# Patient Record
Sex: FEMALE | Race: WHITE | NOT HISPANIC OR LATINO | Employment: UNEMPLOYED | URBAN - METROPOLITAN AREA
[De-identification: names, ages, dates, MRNs, and addresses within clinical notes are randomized per-mention and may not be internally consistent; named-entity substitution may affect disease eponyms.]

---

## 2018-04-11 ENCOUNTER — OFFICE VISIT (OUTPATIENT)
Dept: OBSTETRICS AND GYNECOLOGY | Facility: CLINIC | Age: 38
End: 2018-04-11

## 2018-04-11 VITALS
HEIGHT: 66 IN | SYSTOLIC BLOOD PRESSURE: 124 MMHG | BODY MASS INDEX: 45.48 KG/M2 | WEIGHT: 283 LBS | DIASTOLIC BLOOD PRESSURE: 80 MMHG

## 2018-04-11 DIAGNOSIS — N94.12 DEEP DYSPAREUNIA: ICD-10-CM

## 2018-04-11 DIAGNOSIS — R63.8 INCREASED BMI: ICD-10-CM

## 2018-04-11 DIAGNOSIS — N92.1 MENOMETRORRHAGIA: ICD-10-CM

## 2018-04-11 DIAGNOSIS — I15.9 SECONDARY HYPERTENSION: ICD-10-CM

## 2018-04-11 DIAGNOSIS — Z98.51 HISTORY OF TUBAL LIGATION: ICD-10-CM

## 2018-04-11 DIAGNOSIS — Z13.9 SCREENING FOR CONDITION: Primary | ICD-10-CM

## 2018-04-11 LAB
B-HCG UR QL: NEGATIVE
BILIRUB BLD-MCNC: NEGATIVE MG/DL
CLARITY, POC: CLEAR
COLOR UR: YELLOW
GLUCOSE UR STRIP-MCNC: NEGATIVE MG/DL
INTERNAL NEGATIVE CONTROL: NEGATIVE
INTERNAL POSITIVE CONTROL: POSITIVE
KETONES UR QL: NEGATIVE
LEUKOCYTE EST, POC: NEGATIVE
Lab: NORMAL
NITRITE UR-MCNC: NEGATIVE MG/ML
PH UR: 5 [PH] (ref 5–8)
PROT UR STRIP-MCNC: NEGATIVE MG/DL
RBC # UR STRIP: NEGATIVE /UL
SP GR UR: 1 (ref 1–1.03)
UROBILINOGEN UR QL: NORMAL

## 2018-04-11 PROCEDURE — 81025 URINE PREGNANCY TEST: CPT | Performed by: OBSTETRICS & GYNECOLOGY

## 2018-04-11 PROCEDURE — 99204 OFFICE O/P NEW MOD 45 MIN: CPT | Performed by: OBSTETRICS & GYNECOLOGY

## 2018-04-11 RX ORDER — BUPRENORPHINE HYDROCHLORIDE AND NALOXONE HYDROCHLORIDE DIHYDRATE 8; 2 MG/1; MG/1
TABLET SUBLINGUAL
Refills: 0 | COMMUNITY
Start: 2018-04-05

## 2018-04-11 RX ORDER — GABAPENTIN 400 MG/1
400 CAPSULE ORAL 3 TIMES DAILY
Refills: 0 | COMMUNITY
Start: 2018-03-20

## 2018-04-11 RX ORDER — TRAZODONE HYDROCHLORIDE 150 MG/1
150 TABLET ORAL
Refills: 0 | COMMUNITY
Start: 2018-03-01 | End: 2020-08-08

## 2018-04-11 NOTE — PROGRESS NOTES
Patient Care Team:  No Known Provider as PCP - General    Patient new to practice? yes Patient new to examiner? yes     -----------------------------------------------------HISTORY---------------------------------------------------    Chief Complaint:   Chief Complaint   Patient presents with   • Menstrual Problem     New problem to examiner? yes    No LMP recorded (lmp unknown).      HPI: Pt here with multiple complaints.  Hasn't had insurance in a while and needs to get caught up.  Is having abnormal bleeding, pelvic pain.      Menstrual Problem   This is a chronic problem. The current episode started more than 1 year ago. The problem occurs intermittently. The problem has been gradually worsening. Associated symptoms include abdominal pain. The symptoms are aggravated by exertion. She has tried nothing for the symptoms. The treatment provided no relief.         Review of Systems   Constitutional: Negative.    HENT: Negative.    Eyes: Negative.    Respiratory: Negative.    Cardiovascular: Negative.    Gastrointestinal: Positive for abdominal pain.   Endocrine: Negative.    Genitourinary: Positive for menstrual problem.   Musculoskeletal: Negative.    Skin: Negative.    Allergic/Immunologic: Negative.    Neurological: Negative.    Hematological: Negative.    Psychiatric/Behavioral: Negative.    :      PFSH: PAST HISTORY REVIEWED     1.  History reviewed. No pertinent past medical history.        Status of:      2.   Family History   Problem Relation Age of Onset   • Adopted: Yes   • Cervical cancer Sister    • Ovarian cancer Maternal Grandmother        3. Social History: :    Employment/occupation:  yes  Smoker:  yes  Alcohol: occ Recreational drugs: yes:  THC    4.   Past Surgical History:   Procedure Laterality Date   • TUBAL ABDOMINAL LIGATION          5.   Current Outpatient Prescriptions:   •  buprenorphine-naloxone (SUBOXONE) 8-2 MG per SL tablet, take 2 tablets under the tongue once daily,  "Disp: , Rfl: 0  •  gabapentin (NEURONTIN) 300 MG capsule, Take 300 mg by mouth 3 (Three) Times a Day., Disp: , Rfl: 0  •  traZODone (DESYREL) 150 MG tablet, Take 150 mg by mouth every night at bedtime., Disp: , Rfl: 0    6. No Known Allergies                  -----------------------------------------------PHYSICAL EXAM----------------------------------------------    Vital Signs: /80   Ht 167.6 cm (66\")   Wt 128 kg (283 lb)   LMP  (LMP Unknown)   Breastfeeding? No   BMI 45.68 kg/m²    Flowsheet Rows    Flowsheet Row First Filed Value   Admission Height 167.6 cm (66\") Documented at 04/11/2018 1332   Admission Weight 128 kg (283 lb) Documented at 04/11/2018 1332          Physical Exam   Constitutional: She is oriented to person, place, and time. She appears well-developed and well-nourished.   HENT:   Head: Normocephalic and atraumatic.   Eyes: EOM are normal.   Neck: Normal range of motion.   Pulmonary/Chest: Effort normal.   Abdominal: Soft. Bowel sounds are normal.   Musculoskeletal: Normal range of motion.   Neurological: She is alert and oriented to person, place, and time.   Skin: Skin is warm and dry.   Psychiatric: She has a normal mood and affect. Her behavior is normal. Judgment and thought content normal.   Nursing note and vitals reviewed.                          -----------------------------------------------MEDICAL DECISION MAKING-----------------------------  Risk counseling done:  Yes.  Counseling and guidance discussed:  Nutrition, family planning/contraception, physical activity, healthy weight, injury prevention, misuse of tobacco, alcohol and drugs, sexual behavior and STDs, dental health, mental health, immunizations, screenings, breast cancer and breast exams.    Results Reviewed:     1.   Lab Results (last 24 hours)     Procedure Component Value Units Date/Time    POC Urinalysis Dipstick [[id number]] Collected:  04/11/18 1336    Specimen:  Urine Updated:  04/11/18 1336     Color " Yellow     Clarity, UA Clear     Glucose, UA Negative mg/dL      Bilirubin Negative     Ketones, UA Negative     Specific Gravity  1.005     Blood, UA Negative     pH, Urine 5.0     Protein, POC Negative mg/dL      Urobilinogen, UA Normal     Leukocytes Negative     Nitrite, UA Negative    POC Pregnancy, Urine [[id number]] Collected:  04/11/18 1336    Specimen:  Urine Updated:  04/11/18 1336     HCG, Urine, QL Negative     Lot Number [id number]     Internal Positive Control Positive     Internal Negative Control Negative        2.   Imaging Results (last 24 hours)     ** No results found for the last 24 hours. **        3.   ECG/EMG Results (most recent)     None          Old records reviewed?  no    Diagnoses and/or chronic conditions reviewed with pt:  Fatoumata was seen today for menstrual problem.    Diagnoses and all orders for this visit:    Screening for condition  -     POC Urinalysis Dipstick  -     POC Pregnancy, Urine    History of tubal ligation    Increased BMI    Menometrorrhagia    Deep dyspareunia    Secondary hypertension        IMP:  Menometrorrhagia, pelvic pain, hx tubal.     PLAN: sonohystogram, embx, pap, Check tsh & cbc from PCP.     Labs/imaging ordered: u/s.  Obtain records from referring physician.     RTO Return if symptoms worsen or fail to improve.          Juan Hamm MD  2:00 PM  04/11/18

## 2018-05-14 ENCOUNTER — OFFICE VISIT (OUTPATIENT)
Dept: OBSTETRICS AND GYNECOLOGY | Facility: CLINIC | Age: 38
End: 2018-05-14

## 2018-05-14 ENCOUNTER — PROCEDURE VISIT (OUTPATIENT)
Dept: OBSTETRICS AND GYNECOLOGY | Facility: CLINIC | Age: 38
End: 2018-05-14

## 2018-05-14 VITALS — BODY MASS INDEX: 45.48 KG/M2 | HEIGHT: 66 IN | WEIGHT: 283 LBS

## 2018-05-14 DIAGNOSIS — R63.8 INCREASED BMI: ICD-10-CM

## 2018-05-14 DIAGNOSIS — N92.1 MENOMETRORRHAGIA: ICD-10-CM

## 2018-05-14 DIAGNOSIS — I15.9 SECONDARY HYPERTENSION: ICD-10-CM

## 2018-05-14 DIAGNOSIS — N92.1 MENOMETRORRHAGIA: Primary | ICD-10-CM

## 2018-05-14 DIAGNOSIS — Z13.9 SCREENING FOR CONDITION: Primary | ICD-10-CM

## 2018-05-14 DIAGNOSIS — Z11.51 SPECIAL SCREENING EXAMINATION FOR HUMAN PAPILLOMAVIRUS (HPV): ICD-10-CM

## 2018-05-14 DIAGNOSIS — Z98.51 HISTORY OF TUBAL LIGATION: ICD-10-CM

## 2018-05-14 DIAGNOSIS — Z01.419 PAP SMEAR, AS PART OF ROUTINE GYNECOLOGICAL EXAMINATION: ICD-10-CM

## 2018-05-14 DIAGNOSIS — N94.12 DEEP DYSPAREUNIA: ICD-10-CM

## 2018-05-14 LAB
B-HCG UR QL: NEGATIVE
INTERNAL NEGATIVE CONTROL: NEGATIVE
INTERNAL POSITIVE CONTROL: POSITIVE
Lab: NORMAL

## 2018-05-14 PROCEDURE — 58340 CATHETER FOR HYSTEROGRAPHY: CPT | Performed by: OBSTETRICS & GYNECOLOGY

## 2018-05-14 PROCEDURE — 76831 ECHO EXAM UTERUS: CPT | Performed by: OBSTETRICS & GYNECOLOGY

## 2018-05-14 PROCEDURE — 81025 URINE PREGNANCY TEST: CPT | Performed by: OBSTETRICS & GYNECOLOGY

## 2018-05-14 PROCEDURE — 58100 BIOPSY OF UTERUS LINING: CPT | Performed by: OBSTETRICS & GYNECOLOGY

## 2018-05-14 RX ORDER — ERGOCALCIFEROL 1.25 MG/1
CAPSULE ORAL
COMMUNITY
Start: 2018-04-20 | End: 2020-08-08

## 2018-05-14 RX ORDER — HYDROXYZINE HYDROCHLORIDE 25 MG/1
TABLET, FILM COATED ORAL
COMMUNITY
Start: 2018-04-20 | End: 2020-08-08

## 2018-05-14 NOTE — PROGRESS NOTES
PROCEDURE NOTE FOR SONOHYSTOGRAM OR SALINE INFUSION SONOGRAM (SIS);   PROCEDURE AND INTERPRETATION.    Patient Active Problem List   Diagnosis   • History of tubal ligation   • Increased BMI   • Menometrorrhagia   • Deep dyspareunia   • Secondary hypertension         INDICATION/CC/PREOP DX: menometrorrhagia, dyspareunia    POSTOP DX: same    Informed consent obtained.    Pt present in ultrasound room, scount ultrasound was already done by technician.    ANESTHESIA USED: none    In dorsolithotomy position, open graves speculum placed in the vagina.  Pap smear done? yes    The  cervix was painted with betadine.    Single tooth tenaculum placed on anterior cervical lip and uterus sounded to 7cms.    Endometrial biopsy suction pipette used to obtain endometrial biopsy and this was sent for permanent section.      Flexible sonohyst catheter placed in endomtrial canal and balloon inflated and 5ccs normal saline infused into endometrial cavity.  Outline of cavity seen clearly and documented.    All instruments were removed.    Pt tolerated procedure well and left in satisfactory condition.    Instructions and precautions given.      IMPRESSION/INTERPRETATION:  Normal endometrial contour.  No abnormality so far.      PLAN:  Check pap & embx.  Pt may be candidate for ablation for abnormal bleeding if all ok.  No obvious cause of the dyspareunia.      Juan Hamm MD  1:08 PM   05/14/18

## 2018-05-17 LAB
C TRACH RRNA CVX QL NAA+PROBE: NEGATIVE
CYTOLOGIST CVX/VAG CYTO: ABNORMAL
CYTOLOGY CVX/VAG DOC THIN PREP: ABNORMAL
DX ICD CODE: ABNORMAL
DX ICD CODE: ABNORMAL
HIV 1 & 2 AB SER-IMP: ABNORMAL
HPV I/H RISK 1 DNA CVX QL PROBE+SIG AMP: NEGATIVE
N GONORRHOEA RRNA CVX QL NAA+PROBE: NEGATIVE
OTHER STN SPEC: ABNORMAL
PATH REPORT.FINAL DX SPEC: ABNORMAL
PATHOLOGIST CVX/VAG CYTO: ABNORMAL
RECOM F/U CVX/VAG CYTO: ABNORMAL
STAT OF ADQ CVX/VAG CYTO-IMP: ABNORMAL
T VAGINALIS RRNA SPEC QL NAA+PROBE: NEGATIVE

## 2018-05-18 LAB
DX ICD CODE: NORMAL
DX ICD CODE: NORMAL
PATH REPORT.FINAL DX SPEC: NORMAL
PATH REPORT.GROSS SPEC: NORMAL
PATH REPORT.SITE OF ORIGIN SPEC: NORMAL
PATHOLOGIST NAME: NORMAL
PAYMENT PROCEDURE: NORMAL

## 2018-05-21 PROBLEM — R87.610 ASCUS OF CERVIX WITH NEGATIVE HIGH RISK HPV: Status: ACTIVE | Noted: 2018-05-21

## 2020-08-08 ENCOUNTER — HOSPITAL ENCOUNTER (EMERGENCY)
Facility: HOSPITAL | Age: 40
Discharge: HOME | End: 2020-08-09
Attending: EMERGENCY MEDICINE | Admitting: EMERGENCY MEDICINE

## 2020-08-08 DIAGNOSIS — F29 PSYCHOSIS, UNSPECIFIED PSYCHOSIS TYPE (HCC): ICD-10-CM

## 2020-08-08 DIAGNOSIS — F15.10 METHAMPHETAMINE ABUSE (HCC): Primary | ICD-10-CM

## 2020-08-08 LAB
ALBUMIN SERPL-MCNC: 4.8 G/DL (ref 3.5–5.2)
ALBUMIN/GLOB SERPL: 1.6 G/DL
ALP SERPL-CCNC: 64 U/L (ref 39–117)
ALT SERPL W P-5'-P-CCNC: 19 U/L (ref 1–33)
AMPHET+METHAMPHET UR QL: POSITIVE
ANION GAP SERPL CALCULATED.3IONS-SCNC: 15 MMOL/L (ref 5–15)
AST SERPL-CCNC: 24 U/L (ref 1–32)
BACTERIA UR QL AUTO: ABNORMAL /HPF
BARBITURATES UR QL SCN: NEGATIVE
BASOPHILS # BLD AUTO: 0.04 10*3/MM3 (ref 0–0.2)
BASOPHILS NFR BLD AUTO: 0.2 % (ref 0–1.5)
BENZODIAZ UR QL SCN: POSITIVE
BILIRUB SERPL-MCNC: 0.9 MG/DL (ref 0–1.2)
BILIRUB UR QL STRIP: ABNORMAL
BUN SERPL-MCNC: 14 MG/DL (ref 6–20)
BUN/CREAT SERPL: 23.7 (ref 7–25)
CALCIUM SPEC-SCNC: 10 MG/DL (ref 8.6–10.5)
CANNABINOIDS SERPL QL: POSITIVE
CHLORIDE SERPL-SCNC: 102 MMOL/L (ref 98–107)
CLARITY UR: ABNORMAL
CO2 SERPL-SCNC: 21 MMOL/L (ref 22–29)
COCAINE UR QL: NEGATIVE
COLOR UR: YELLOW
CREAT SERPL-MCNC: 0.59 MG/DL (ref 0.57–1)
DEPRECATED RDW RBC AUTO: 41.3 FL (ref 37–54)
EOSINOPHIL # BLD AUTO: 0.01 10*3/MM3 (ref 0–0.4)
EOSINOPHIL NFR BLD AUTO: 0.1 % (ref 0.3–6.2)
ERYTHROCYTE [DISTWIDTH] IN BLOOD BY AUTOMATED COUNT: 12.4 % (ref 12.3–15.4)
ETHANOL BLD-MCNC: <10 MG/DL (ref 0–10)
ETHANOL UR QL: <0.01 %
GFR SERPL CREATININE-BSD FRML MDRD: 115 ML/MIN/1.73
GLOBULIN UR ELPH-MCNC: 3 GM/DL
GLUCOSE SERPL-MCNC: 119 MG/DL (ref 65–99)
GLUCOSE UR STRIP-MCNC: NEGATIVE MG/DL
HCG SERPL QL: NEGATIVE
HCT VFR BLD AUTO: 43.9 % (ref 34–46.6)
HGB BLD-MCNC: 14.5 G/DL (ref 12–15.9)
HGB UR QL STRIP.AUTO: ABNORMAL
HYALINE CASTS UR QL AUTO: ABNORMAL /LPF
IMM GRANULOCYTES # BLD AUTO: 0.07 10*3/MM3 (ref 0–0.05)
IMM GRANULOCYTES NFR BLD AUTO: 0.4 % (ref 0–0.5)
KETONES UR QL STRIP: ABNORMAL
LEUKOCYTE ESTERASE UR QL STRIP.AUTO: NEGATIVE
LYMPHOCYTES # BLD AUTO: 1.48 10*3/MM3 (ref 0.7–3.1)
LYMPHOCYTES NFR BLD AUTO: 8.9 % (ref 19.6–45.3)
MCH RBC QN AUTO: 30.1 PG (ref 26.6–33)
MCHC RBC AUTO-ENTMCNC: 33 G/DL (ref 31.5–35.7)
MCV RBC AUTO: 91.3 FL (ref 79–97)
METHADONE UR QL SCN: NEGATIVE
MONOCYTES # BLD AUTO: 1.02 10*3/MM3 (ref 0.1–0.9)
MONOCYTES NFR BLD AUTO: 6.2 % (ref 5–12)
NEUTROPHILS NFR BLD AUTO: 13.96 10*3/MM3 (ref 1.7–7)
NEUTROPHILS NFR BLD AUTO: 84.2 % (ref 42.7–76)
NITRITE UR QL STRIP: NEGATIVE
NRBC BLD AUTO-RTO: 0 /100 WBC (ref 0–0.2)
OPIATES UR QL: NEGATIVE
OXYCODONE UR QL SCN: NEGATIVE
PH UR STRIP.AUTO: 6 [PH] (ref 5–8)
PLATELET # BLD AUTO: 342 10*3/MM3 (ref 140–450)
PMV BLD AUTO: 9.3 FL (ref 6–12)
POTASSIUM SERPL-SCNC: 3.3 MMOL/L (ref 3.5–5.2)
PROT SERPL-MCNC: 7.8 G/DL (ref 6–8.5)
PROT UR QL STRIP: ABNORMAL
RBC # BLD AUTO: 4.81 10*6/MM3 (ref 3.77–5.28)
RBC # UR: ABNORMAL /HPF
REF LAB TEST METHOD: ABNORMAL
SODIUM SERPL-SCNC: 138 MMOL/L (ref 136–145)
SP GR UR STRIP: >=1.03 (ref 1–1.03)
SQUAMOUS #/AREA URNS HPF: ABNORMAL /HPF
UROBILINOGEN UR QL STRIP: ABNORMAL
WBC # BLD AUTO: 16.58 10*3/MM3 (ref 3.4–10.8)
WBC UR QL AUTO: ABNORMAL /HPF

## 2020-08-08 PROCEDURE — 85025 COMPLETE CBC W/AUTO DIFF WBC: CPT | Performed by: NURSE PRACTITIONER

## 2020-08-08 PROCEDURE — 80307 DRUG TEST PRSMV CHEM ANLYZR: CPT | Performed by: NURSE PRACTITIONER

## 2020-08-08 PROCEDURE — 99284 EMERGENCY DEPT VISIT MOD MDM: CPT

## 2020-08-08 PROCEDURE — 81001 URINALYSIS AUTO W/SCOPE: CPT | Performed by: NURSE PRACTITIONER

## 2020-08-08 PROCEDURE — 84703 CHORIONIC GONADOTROPIN ASSAY: CPT | Performed by: NURSE PRACTITIONER

## 2020-08-08 PROCEDURE — 80053 COMPREHEN METABOLIC PANEL: CPT | Performed by: NURSE PRACTITIONER

## 2020-08-08 RX ORDER — NITROFURANTOIN 25; 75 MG/1; MG/1
100 CAPSULE ORAL 2 TIMES DAILY
COMMUNITY

## 2020-08-08 RX ORDER — CLONAZEPAM 0.5 MG/1
0.5 TABLET ORAL 2 TIMES DAILY PRN
COMMUNITY

## 2020-08-08 RX ORDER — BUSPIRONE HYDROCHLORIDE 15 MG/1
30 TABLET ORAL 3 TIMES DAILY PRN
COMMUNITY

## 2020-08-08 RX ORDER — LORAZEPAM 2 MG/ML
1 INJECTION INTRAMUSCULAR ONCE
Status: DISCONTINUED | OUTPATIENT
Start: 2020-08-08 | End: 2020-08-08

## 2020-08-08 RX ORDER — RISPERIDONE 0.5 MG/1
2 TABLET, ORALLY DISINTEGRATING ORAL ONCE
Status: DISCONTINUED | OUTPATIENT
Start: 2020-08-08 | End: 2020-08-08

## 2020-08-08 RX ORDER — RISPERIDONE 1 MG/ML
2 SOLUTION ORAL ONCE
Status: COMPLETED | OUTPATIENT
Start: 2020-08-08 | End: 2020-08-08

## 2020-08-08 RX ORDER — LORAZEPAM 1 MG/1
2 TABLET ORAL ONCE
Status: COMPLETED | OUTPATIENT
Start: 2020-08-08 | End: 2020-08-08

## 2020-08-08 RX ADMIN — LORAZEPAM 2 MG: 1 TABLET ORAL at 21:23

## 2020-08-08 RX ADMIN — RISPERIDONE 2 MG: 1 SOLUTION ORAL at 21:23
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
## 2020-08-08 NOTE — ED PROVIDER NOTES
"Pt presents to the ED c/o  increasing anxiety and hallucinations.  Patient states that she came from [facility] to here to see her son.  She also admits that she uses marijuana and tried methamphetamine 3 days ago.  EMS reports the patient was actively hallucinating and was talking to somebody who was not there.  Patient states she has anxiety and depression and is on Klonopin.  Her last dose of Klonopin was yesterday.     On exam,   Her heart is regular rate and rhythm without any murmurs.  Her lungs are clear to auscultation bilaterally.  Abdomen is normoactive bowel sounds, soft, nontender nondistended.  Patient appears disheveled and she has a healing ecchymosis around her left eye.  Neurologically, patient has pressured speech and is anxious.  She is not homicidal but has had occasional suicidal thoughts.  However, patient states that \"God will take care of me.\"     2100  Patient has been evaluated by Leonel with access.  He recommends that we give patient Ativan and Risperdal.  He states that she is not in a good state of mind to determine disposition secondary to patient's meth use.  He will come by at 5 AM to reevaluate patient to determine disposition of patient.    2313  I have discussed the case with Dr. Correa, emergency medicine.  The plan is to have patient reevaluated by [facility] later this evening to determine disposition of patient.  Dr. Correa takes over care of patient.  Plan: Agree with plan of checking alcohol and urine drug screen and consulting psych.      Patient was placed in face mask in first look. Patient was wearing facemask when I entered the room and throughout our encounter. I wore full protective equipment throughout this patient encounter including a face mask, eye shield and gloves. Hand hygiene was performed before donning protective equipment and after removal when leaving the room.    Final diagnoses:   Methamphetamine abuse (CMS/[facility])   Psychosis, unspecified psychosis " type (CMS/[facility])          Attestation:  The [facility] and I have discussed this patient's history, physical exam, and treatment plan.  I have reviewed the documentation and personally had a face to face interaction with the patient. I affirm the documentation and agree with the treatment and plan.  The attached note describes my personal findings.            Jas Ruiz MD  08/08/20 1892       Jas Ruiz MD  08/08/20 1388
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
## 2020-08-09 VITALS
DIASTOLIC BLOOD PRESSURE: 81 MMHG | WEIGHT: 230 LBS | BODY MASS INDEX: 40.75 KG/M2 | RESPIRATION RATE: 16 BRPM | TEMPERATURE: 98.2 F | HEIGHT: 63 IN | HEART RATE: 98 BPM | SYSTOLIC BLOOD PRESSURE: 122 MMHG | OXYGEN SATURATION: 95 %

## 2020-08-09 PROCEDURE — 90791 PSYCH DIAGNOSTIC EVALUATION: CPT

## 2020-12-10 ENCOUNTER — OFFICE VISIT (OUTPATIENT)
Dept: OBSTETRICS AND GYNECOLOGY | Facility: CLINIC | Age: 40
End: 2020-12-10

## 2020-12-10 VITALS
SYSTOLIC BLOOD PRESSURE: 124 MMHG | BODY MASS INDEX: 38.88 KG/M2 | DIASTOLIC BLOOD PRESSURE: 76 MMHG | HEIGHT: 63 IN | WEIGHT: 219.4 LBS

## 2020-12-10 DIAGNOSIS — R63.8 INCREASED BMI: ICD-10-CM

## 2020-12-10 DIAGNOSIS — Z01.419 ROUTINE GYNECOLOGICAL EXAMINATION: Primary | ICD-10-CM

## 2020-12-10 DIAGNOSIS — N92.0 MENORRHAGIA WITH REGULAR CYCLE: ICD-10-CM

## 2020-12-10 DIAGNOSIS — F17.200 SMOKER: ICD-10-CM

## 2020-12-10 DIAGNOSIS — Z01.419 PAP SMEAR, LOW-RISK: ICD-10-CM

## 2020-12-10 DIAGNOSIS — Z11.51 SPECIAL SCREENING EXAMINATION FOR HUMAN PAPILLOMAVIRUS (HPV): ICD-10-CM

## 2020-12-10 DIAGNOSIS — E66.01 MORBIDLY OBESE (HCC): ICD-10-CM

## 2020-12-10 DIAGNOSIS — Z98.51 S/P TUBAL LIGATION: ICD-10-CM

## 2020-12-10 PROCEDURE — 58100 BIOPSY OF UTERUS LINING: CPT | Performed by: NURSE PRACTITIONER

## 2020-12-10 PROCEDURE — 81025 URINE PREGNANCY TEST: CPT | Performed by: NURSE PRACTITIONER

## 2020-12-10 PROCEDURE — 99395 PREV VISIT EST AGE 18-39: CPT | Performed by: NURSE PRACTITIONER

## 2020-12-10 RX ORDER — OXCARBAZEPINE 150 MG/1
150 TABLET, FILM COATED ORAL 2 TIMES DAILY
COMMUNITY
Start: 2020-10-06

## 2020-12-10 RX ORDER — CLONAZEPAM 1 MG/1
TABLET ORAL
COMMUNITY
Start: 2020-11-18

## 2020-12-10 RX ORDER — QUETIAPINE FUMARATE 50 MG/1
TABLET, FILM COATED ORAL
COMMUNITY
Start: 2020-12-02

## 2020-12-10 NOTE — PROGRESS NOTES
"GYN Annual Exam     Chief Complaint   Patient presents with   • Follow-up     US       Fatoumata Jara is a 39 y.o. female who presents for annual well woman exam. She is an established patient. She is a . She is not sexually active at this time. Her last sexual activity was approx 2-3 months ago. Currently using tubal for contraception. She is happy with her contraception: no because her periods are heavy. No intermenstrual bleeding, spotting, or discharge.  Performing SBE: does not do regularly     She complains of heavy periods. She states her period occur every 22 or so days. She bleeds usually 3-5 days. She states the 2nd and 3rd day are very heavy. She also has clotting. The clots are described as dime to nickel size. She wears pad. She states she soaks through her pad \" all the time.\" She does use overnight pads during the day d/t the heaviness of her flow. She cramps occas.     HPI    OB History        4    Para   2    Term   2            AB   2    Living   2       SAB        TAB        Ectopic        Molar        Multiple        Live Births                    Last Pap : ASCUS/HPV neg     History of abnormal Pap smear: yes - Reports \" a long long time ago.\"   Family history of uterine, colon or ovarian cancer: no  Family history of breast cancer: no  History of abnormal mammogram: no  Gardasil Vaccine: Did not get     Past Medical History:   Diagnosis Date   • Anxiety    • Gestational diabetes        Past Surgical History:   Procedure Laterality Date   • TUBAL ABDOMINAL LIGATION           Current Outpatient Medications:   •  buprenorphine-naloxone (SUBOXONE) 8-2 MG per SL tablet, take 2 tablets under the tongue once daily, Disp: , Rfl: 0  •  busPIRone (BUSPAR) 15 MG tablet, Take 30 mg by mouth 3 (Three) Times a Day As Needed., Disp: , Rfl:   •  clonazePAM (KlonoPIN) 0.5 MG tablet, Take 0.5 mg by mouth 2 (Two) Times a Day As Needed for Anxiety or Seizures., Disp: , Rfl:   •  clonazePAM " "(KlonoPIN) 1 MG tablet, , Disp: , Rfl:   •  gabapentin (NEURONTIN) 400 MG capsule, Take 400 mg by mouth 3 (Three) Times a Day., Disp: , Rfl: 0  •  nitrofurantoin, macrocrystal-monohydrate, (MACROBID) 100 MG capsule, Take 100 mg by mouth 2 (Two) Times a Day., Disp: , Rfl:   •  OXcarbazepine (TRILEPTAL) 150 MG tablet, Take 150 mg by mouth 2 (Two) Times a Day., Disp: , Rfl:   •  QUEtiapine (SEROquel) 50 MG tablet, , Disp: , Rfl:   •  sertraline (ZOLOFT) 50 MG tablet, Reports she started on this 3 days ago, only took one dose, Disp: , Rfl:     No Known Allergies    Social History     Tobacco Use   • Smoking status: Current Every Day Smoker   Substance Use Topics   • Alcohol use: No   • Drug use: No       Family History   Adopted: Yes   Problem Relation Age of Onset   • Cervical cancer Sister    • Ovarian cancer Maternal Grandmother    • Breast cancer Neg Hx    • Colon cancer Neg Hx    • Uterine cancer Neg Hx        Review of Systems   Constitutional: Positive for fatigue and unexpected weight gain.   Respiratory: Negative.    Cardiovascular: Negative.    Gastrointestinal: Negative.    Genitourinary: Positive for menstrual problem.   Musculoskeletal: Negative.    Skin: Negative.    Neurological: Negative.    Psychiatric/Behavioral: Negative.        /76   Ht 160 cm (62.99\")   Wt 99.5 kg (219 lb 6.4 oz)   LMP 12/05/2020   Breastfeeding No   BMI 38.87 kg/m²     Physical Exam  Vitals signs reviewed.   Constitutional:       Appearance: She is well-developed.   Neck:      Musculoskeletal: Normal range of motion and neck supple.      Thyroid: No thyromegaly.   Cardiovascular:      Rate and Rhythm: Normal rate and regular rhythm.   Pulmonary:      Effort: Pulmonary effort is normal.      Breath sounds: Normal breath sounds.   Chest:      Breasts:         Right: No inverted nipple, mass, nipple discharge, skin change or tenderness.         Left: No inverted nipple, mass, nipple discharge, skin change or tenderness. "   Abdominal:      General: Abdomen is flat. Bowel sounds are normal.      Palpations: Abdomen is soft.   Genitourinary:     Exam position: Supine.      Labia:         Right: No rash, tenderness, lesion or injury.         Left: No rash, tenderness, lesion or injury.       Vagina: No signs of injury and foreign body. No vaginal discharge, erythema, tenderness or bleeding.      Cervix: No cervical motion tenderness, discharge or friability.      Uterus: Not deviated, not enlarged, not fixed and not tender.       Adnexa:         Right: No mass, tenderness or fullness.          Left: No mass, tenderness or fullness.        Rectum: Normal.   Musculoskeletal: Normal range of motion.         General: No swelling.   Skin:     General: Skin is warm and dry.   Neurological:      General: No focal deficit present.      Mental Status: She is alert and oriented to person, place, and time.   Psychiatric:         Mood and Affect: Mood normal.         Behavior: Behavior normal.       US IMP:  Uterus is anteverted.  Uterus is normal shape and size.  Endometrial lining 1.2 cm.  Multiple calcifications noted on the posterior endometrial wall.  Ovaries within normal limits.    Chief Complaint   Patient presents with   • Follow-up     US       PROCEDURE NOTE    Procedures      Diagnosis  menorrhagia and thickened EL        Patient's last menstrual period was 12/05/2020.         UCG  negative    Menopausal No   Patient takes BCP  Not currently -  Past successful methods:  S/P tubal ligation    HRT  negative  Applying Universal Precautions I properly identified the patient and sought her signature with informed consent.  The reason for the biopsy was discussed.  Using a betadine prep on the cervix the cervix was grasped with a tenaculum and the uterus sounded to 8 cm.  A disposable Pipelle was used to retrieve the specimen by passing it 5 times into the cavity hoping to sample more than one area.    Additional procedures necessary were not  necessary  The specimen was labelled and placed in a formalin container.    Instructions were given on vaginal rest, OTC tylenol, Motrin or Aleve, and expected future bleeding.  Resulting and follow up were discussed.         Assessment     1. Well woman exam   2. S/P tubal  3. Menorrhagia with thickened EL  4. Increased BMI  5. Smoker         Plan   1. Well woman exam: Pap collected Yes. Instructed on how to perform SBE. Recommend MVI daily.    2. Contraception: S/P tubal ligation   3. STD: Enc condoms. Desires STD screen today- No.   4. Smoking status: Currently vapes. She is working on smoking cessation. Cont to enc cessation.   5.   Patient's Body mass index is 38.87 kg/m². BMI is above normal parameters. Recommendations include: exercise counseling and nutrition counseling.  6.   Menorrhagia- Check CBC and Thyroid panel. Rev US findings with patient. Disc possible causes of endometrial lining. Disc at length endometrial biopsy versus hysteroscopy D&C.  Patient is possibly interested in endometrial ablation but is uncertain.  After lengthy discussion the patient agreed to proceed with endometrial biopsy today in the office.  Plan of care will be based off of endometrial biopsy results.  Information provided on contraception methods for cycle regulation and endometrial ablation.     RTO JUAN Romero, ANDREAS  12/10/2020  13:25 EST

## 2020-12-11 LAB
BASOPHILS # BLD AUTO: 0 X10E3/UL (ref 0–0.2)
BASOPHILS NFR BLD AUTO: 0 %
EOSINOPHIL # BLD AUTO: 0.2 X10E3/UL (ref 0–0.4)
EOSINOPHIL NFR BLD AUTO: 2 %
ERYTHROCYTE [DISTWIDTH] IN BLOOD BY AUTOMATED COUNT: 12 % (ref 11.7–15.4)
HCT VFR BLD AUTO: 43.2 % (ref 34–46.6)
HGB BLD-MCNC: 15.1 G/DL (ref 11.1–15.9)
IMM GRANULOCYTES # BLD AUTO: 0 X10E3/UL (ref 0–0.1)
IMM GRANULOCYTES NFR BLD AUTO: 0 %
LYMPHOCYTES # BLD AUTO: 2.1 X10E3/UL (ref 0.7–3.1)
LYMPHOCYTES NFR BLD AUTO: 33 %
MCH RBC QN AUTO: 31.3 PG (ref 26.6–33)
MCHC RBC AUTO-ENTMCNC: 35 G/DL (ref 31.5–35.7)
MCV RBC AUTO: 90 FL (ref 79–97)
MONOCYTES # BLD AUTO: 0.8 X10E3/UL (ref 0.1–0.9)
MONOCYTES NFR BLD AUTO: 12 %
NEUTROPHILS # BLD AUTO: 3.3 X10E3/UL (ref 1.4–7)
NEUTROPHILS NFR BLD AUTO: 53 %
PLATELET # BLD AUTO: 291 X10E3/UL (ref 150–450)
RBC # BLD AUTO: 4.82 X10E6/UL (ref 3.77–5.28)
T4 FREE SERPL-MCNC: 0.99 NG/DL (ref 0.82–1.77)
TSH SERPL DL<=0.005 MIU/L-ACNC: 5.4 UIU/ML (ref 0.45–4.5)
WBC # BLD AUTO: 6.4 X10E3/UL (ref 3.4–10.8)

## 2020-12-14 LAB
DX ICD CODE: NORMAL
PATH REPORT.FINAL DX SPEC: NORMAL
PATH REPORT.GROSS SPEC: NORMAL
PATH REPORT.SITE OF ORIGIN SPEC: NORMAL
PATHOLOGIST NAME: NORMAL
PAYMENT PROCEDURE: NORMAL

## 2020-12-15 LAB
CYTOLOGIST CVX/VAG CYTO: NORMAL
CYTOLOGY CVX/VAG DOC CYTO: NORMAL
CYTOLOGY CVX/VAG DOC THIN PREP: NORMAL
DX ICD CODE: NORMAL
HIV 1 & 2 AB SER-IMP: NORMAL
HPV I/H RISK 1 DNA CVX QL PROBE+SIG AMP: NORMAL
HPV I/H RISK 4 DNA CVX QL PROBE+SIG AMP: NEGATIVE
OTHER STN SPEC: NORMAL
STAT OF ADQ CVX/VAG CYTO-IMP: NORMAL

## 2020-12-17 PROBLEM — E66.01 MORBIDLY OBESE (HCC): Status: ACTIVE | Noted: 2020-12-17

## 2020-12-17 PROBLEM — Z98.51 S/P TUBAL LIGATION: Status: ACTIVE | Noted: 2020-12-17

## 2020-12-17 PROBLEM — F17.200 SMOKER: Status: ACTIVE | Noted: 2020-12-17

## 2020-12-17 PROBLEM — Z01.419 ROUTINE GYNECOLOGICAL EXAMINATION: Status: ACTIVE | Noted: 2020-12-17

## 2020-12-17 PROBLEM — N92.0 MENORRHAGIA WITH REGULAR CYCLE: Status: ACTIVE | Noted: 2020-12-17

## 2020-12-17 PROBLEM — E66.01 MORBIDLY OBESE: Status: ACTIVE | Noted: 2020-12-17

## 2020-12-17 LAB
B-HCG UR QL: NEGATIVE
INTERNAL NEGATIVE CONTROL: NEGATIVE
INTERNAL POSITIVE CONTROL: POSITIVE
Lab: 55

## 2024-01-18 PROBLEM — Z98.51 HISTORY OF TUBAL LIGATION: Status: RESOLVED | Noted: 2018-04-11 | Resolved: 2024-01-18

## 2024-01-18 PROBLEM — Z79.899 ENCOUNTER FOR MONITORING SUBOXONE MAINTENANCE THERAPY: Status: ACTIVE | Noted: 2024-01-18

## 2024-01-18 PROBLEM — Z51.81 ENCOUNTER FOR MONITORING SUBOXONE MAINTENANCE THERAPY: Status: ACTIVE | Noted: 2024-01-18

## 2024-01-18 PROBLEM — Z01.419 ROUTINE GYNECOLOGICAL EXAMINATION: Status: RESOLVED | Noted: 2020-12-17 | Resolved: 2024-01-18

## 2024-01-18 PROBLEM — R63.8 INCREASED BMI: Status: RESOLVED | Noted: 2018-04-11 | Resolved: 2024-01-18

## 2024-12-23 ENCOUNTER — TRANSCRIBE ORDERS (OUTPATIENT)
Dept: ADMINISTRATIVE | Facility: HOSPITAL | Age: 44
End: 2024-12-23
Payer: COMMERCIAL

## 2024-12-23 ENCOUNTER — LAB (OUTPATIENT)
Dept: LAB | Facility: HOSPITAL | Age: 44
End: 2024-12-23
Payer: COMMERCIAL

## 2024-12-23 DIAGNOSIS — F11.20 OPIOID TYPE DEPENDENCE, CONTINUOUS USE: Primary | ICD-10-CM

## 2024-12-23 DIAGNOSIS — F11.20 OPIOID TYPE DEPENDENCE, CONTINUOUS USE: ICD-10-CM

## 2024-12-23 LAB
AMPHET+METHAMPHET UR QL: POSITIVE
AMPHETAMINES UR QL: NEGATIVE
BARBITURATES UR QL SCN: NEGATIVE
BENZODIAZ UR QL SCN: POSITIVE
BUPRENORPHINE SERPL-MCNC: POSITIVE NG/ML
CANNABINOIDS SERPL QL: POSITIVE
COCAINE UR QL: NEGATIVE
METHADONE UR QL SCN: NEGATIVE
OPIATES UR QL: NEGATIVE
OXYCODONE UR QL SCN: NEGATIVE
PCP UR QL SCN: NEGATIVE
TRICYCLICS UR QL SCN: POSITIVE

## 2024-12-23 PROCEDURE — 80306 DRUG TEST PRSMV INSTRMNT: CPT
